# Patient Record
Sex: MALE | Race: WHITE | NOT HISPANIC OR LATINO | Employment: FULL TIME | URBAN - NONMETROPOLITAN AREA
[De-identification: names, ages, dates, MRNs, and addresses within clinical notes are randomized per-mention and may not be internally consistent; named-entity substitution may affect disease eponyms.]

---

## 2021-11-09 ENCOUNTER — HOSPITAL ENCOUNTER (EMERGENCY)
Facility: HOSPITAL | Age: 56
Discharge: HOME | End: 2021-11-09
Attending: EMERGENCY MEDICINE | Admitting: EMERGENCY MEDICINE
Payer: COMMERCIAL

## 2021-11-09 VITALS
HEIGHT: 69 IN | WEIGHT: 190 LBS | OXYGEN SATURATION: 97 % | TEMPERATURE: 98 F | BODY MASS INDEX: 28.14 KG/M2 | RESPIRATION RATE: 17 BRPM | SYSTOLIC BLOOD PRESSURE: 159 MMHG | DIASTOLIC BLOOD PRESSURE: 98 MMHG | HEART RATE: 74 BPM

## 2021-11-09 DIAGNOSIS — R04.0 RIGHT-SIDED EPISTAXIS: Primary | ICD-10-CM

## 2021-11-09 PROCEDURE — 99284 EMERGENCY DEPT VISIT MOD MDM: CPT | Performed by: PHYSICIAN ASSISTANT

## 2021-11-09 PROCEDURE — 99283 EMERGENCY DEPT VISIT LOW MDM: CPT

## 2021-11-09 PROCEDURE — 30903 CONTROL OF NOSEBLEED: CPT | Performed by: PHYSICIAN ASSISTANT

## 2021-11-09 RX ADMIN — SILVER NITRATE APPLICATORS 2 APPLICATOR: 25; 75 STICK TOPICAL at 20:40

## 2021-11-09 RX ADMIN — SILVER NITRATE APPLICATORS 1 APPLICATOR: 25; 75 STICK TOPICAL at 20:32

## 2024-04-22 ENCOUNTER — HOSPITAL ENCOUNTER (EMERGENCY)
Facility: HOSPITAL | Age: 59
Discharge: HOME | End: 2024-04-22
Attending: EMERGENCY MEDICINE | Admitting: EMERGENCY MEDICINE
Payer: COMMERCIAL

## 2024-04-22 ENCOUNTER — APPOINTMENT (EMERGENCY)
Dept: CT IMAGING | Facility: HOSPITAL | Age: 59
End: 2024-04-22
Payer: COMMERCIAL

## 2024-04-22 VITALS
SYSTOLIC BLOOD PRESSURE: 159 MMHG | RESPIRATION RATE: 20 BRPM | BODY MASS INDEX: 30.08 KG/M2 | DIASTOLIC BLOOD PRESSURE: 91 MMHG | OXYGEN SATURATION: 94 % | HEART RATE: 78 BPM | TEMPERATURE: 98.1 F | WEIGHT: 203.71 LBS

## 2024-04-22 DIAGNOSIS — I16.0 HYPERTENSIVE URGENCY: ICD-10-CM

## 2024-04-22 DIAGNOSIS — H34.8120 CENTRAL RETINAL VEIN OCCLUSION WITH MACULAR EDEMA OF LEFT EYE: Primary | ICD-10-CM

## 2024-04-22 LAB
ALBUMIN SERPL BCP-MCNC: 4.5 G/DL (ref 3.5–5)
ALP SERPL-CCNC: 87 U/L (ref 34–104)
ALT SERPL W P-5'-P-CCNC: 33 U/L (ref 7–52)
ANION GAP SERPL CALCULATED.3IONS-SCNC: 7 MMOL/L (ref 4–13)
APTT PPP: 29 SECONDS (ref 23–37)
AST SERPL W P-5'-P-CCNC: 23 U/L (ref 13–39)
BASOPHILS # BLD AUTO: 0.08 THOUSANDS/ÂΜL (ref 0–0.1)
BASOPHILS NFR BLD AUTO: 1 % (ref 0–1)
BILIRUB SERPL-MCNC: 0.57 MG/DL (ref 0.2–1)
BUN SERPL-MCNC: 19 MG/DL (ref 5–25)
CALCIUM SERPL-MCNC: 9.4 MG/DL (ref 8.4–10.2)
CARDIAC TROPONIN I PNL SERPL HS: 4 NG/L
CHLORIDE SERPL-SCNC: 104 MMOL/L (ref 96–108)
CO2 SERPL-SCNC: 26 MMOL/L (ref 21–32)
CREAT SERPL-MCNC: 0.9 MG/DL (ref 0.6–1.3)
EOSINOPHIL # BLD AUTO: 0.09 THOUSAND/ÂΜL (ref 0–0.61)
EOSINOPHIL NFR BLD AUTO: 1 % (ref 0–6)
ERYTHROCYTE [DISTWIDTH] IN BLOOD BY AUTOMATED COUNT: 13.3 % (ref 11.6–15.1)
GFR SERPL CREATININE-BSD FRML MDRD: 92 ML/MIN/1.73SQ M
GLUCOSE SERPL-MCNC: 113 MG/DL (ref 65–140)
GLUCOSE SERPL-MCNC: 98 MG/DL (ref 65–140)
HCT VFR BLD AUTO: 42.7 % (ref 36.5–49.3)
HGB BLD-MCNC: 14 G/DL (ref 12–17)
IMM GRANULOCYTES # BLD AUTO: 0.02 THOUSAND/UL (ref 0–0.2)
IMM GRANULOCYTES NFR BLD AUTO: 0 % (ref 0–2)
INR PPP: 0.98 (ref 0.84–1.19)
LACTATE SERPL-SCNC: 0.7 MMOL/L (ref 0.5–2)
LIPASE SERPL-CCNC: 19 U/L (ref 11–82)
LYMPHOCYTES # BLD AUTO: 2.4 THOUSANDS/ÂΜL (ref 0.6–4.47)
LYMPHOCYTES NFR BLD AUTO: 30 % (ref 14–44)
MAGNESIUM SERPL-MCNC: 2 MG/DL (ref 1.9–2.7)
MCH RBC QN AUTO: 28.6 PG (ref 26.8–34.3)
MCHC RBC AUTO-ENTMCNC: 32.8 G/DL (ref 31.4–37.4)
MCV RBC AUTO: 87 FL (ref 82–98)
MONOCYTES # BLD AUTO: 0.89 THOUSAND/ÂΜL (ref 0.17–1.22)
MONOCYTES NFR BLD AUTO: 11 % (ref 4–12)
NEUTROPHILS # BLD AUTO: 4.46 THOUSANDS/ÂΜL (ref 1.85–7.62)
NEUTS SEG NFR BLD AUTO: 57 % (ref 43–75)
NRBC BLD AUTO-RTO: 0 /100 WBCS
PLATELET # BLD AUTO: 334 THOUSANDS/UL (ref 149–390)
PMV BLD AUTO: 11 FL (ref 8.9–12.7)
POTASSIUM SERPL-SCNC: 3.7 MMOL/L (ref 3.5–5.3)
PROT SERPL-MCNC: 7.5 G/DL (ref 6.4–8.4)
PROTHROMBIN TIME: 13.3 SECONDS (ref 11.6–14.5)
RBC # BLD AUTO: 4.89 MILLION/UL (ref 3.88–5.62)
SODIUM SERPL-SCNC: 137 MMOL/L (ref 135–147)
WBC # BLD AUTO: 7.94 THOUSAND/UL (ref 4.31–10.16)

## 2024-04-22 PROCEDURE — 83605 ASSAY OF LACTIC ACID: CPT | Performed by: EMERGENCY MEDICINE

## 2024-04-22 PROCEDURE — 83690 ASSAY OF LIPASE: CPT | Performed by: EMERGENCY MEDICINE

## 2024-04-22 PROCEDURE — 80053 COMPREHEN METABOLIC PANEL: CPT | Performed by: EMERGENCY MEDICINE

## 2024-04-22 PROCEDURE — 36415 COLL VENOUS BLD VENIPUNCTURE: CPT | Performed by: EMERGENCY MEDICINE

## 2024-04-22 PROCEDURE — 83735 ASSAY OF MAGNESIUM: CPT | Performed by: EMERGENCY MEDICINE

## 2024-04-22 PROCEDURE — 85025 COMPLETE CBC W/AUTO DIFF WBC: CPT | Performed by: EMERGENCY MEDICINE

## 2024-04-22 PROCEDURE — 84484 ASSAY OF TROPONIN QUANT: CPT | Performed by: EMERGENCY MEDICINE

## 2024-04-22 PROCEDURE — 70496 CT ANGIOGRAPHY HEAD: CPT

## 2024-04-22 PROCEDURE — 85730 THROMBOPLASTIN TIME PARTIAL: CPT | Performed by: EMERGENCY MEDICINE

## 2024-04-22 PROCEDURE — 99284 EMERGENCY DEPT VISIT MOD MDM: CPT

## 2024-04-22 PROCEDURE — 93005 ELECTROCARDIOGRAM TRACING: CPT

## 2024-04-22 PROCEDURE — 70498 CT ANGIOGRAPHY NECK: CPT

## 2024-04-22 PROCEDURE — 82948 REAGENT STRIP/BLOOD GLUCOSE: CPT

## 2024-04-22 PROCEDURE — 85610 PROTHROMBIN TIME: CPT | Performed by: EMERGENCY MEDICINE

## 2024-04-22 PROCEDURE — 99285 EMERGENCY DEPT VISIT HI MDM: CPT | Performed by: EMERGENCY MEDICINE

## 2024-04-22 RX ORDER — AMLODIPINE BESYLATE 5 MG/1
5 TABLET ORAL DAILY
Qty: 30 TABLET | Refills: 0 | Status: SHIPPED | OUTPATIENT
Start: 2024-04-22 | End: 2024-05-22

## 2024-04-22 RX ORDER — HYDRALAZINE HYDROCHLORIDE 20 MG/ML
10 INJECTION INTRAMUSCULAR; INTRAVENOUS ONCE
Status: DISCONTINUED | OUTPATIENT
Start: 2024-04-22 | End: 2024-04-22 | Stop reason: HOSPADM

## 2024-04-22 RX ORDER — DEXTROAMPHETAMINE SACCHARATE, AMPHETAMINE ASPARTATE MONOHYDRATE, DEXTROAMPHETAMINE SULFATE AND AMPHETAMINE SULFATE 5; 5; 5; 5 MG/1; MG/1; MG/1; MG/1
20 CAPSULE, EXTENDED RELEASE ORAL
COMMUNITY

## 2024-04-22 RX ORDER — CLONIDINE HYDROCHLORIDE 0.1 MG/1
0.2 TABLET ORAL ONCE
Status: DISCONTINUED | OUTPATIENT
Start: 2024-04-22 | End: 2024-04-22 | Stop reason: HOSPADM

## 2024-04-22 RX ADMIN — IOHEXOL 85 ML: 350 INJECTION, SOLUTION INTRAVENOUS at 12:20

## 2024-04-22 NOTE — ED PROVIDER NOTES
History  Chief Complaint   Patient presents with    Eye Problem     Patient sent from eye doctor for abnormal finding in left eye. Patient having blurred vision for about 1 week. Patient denies any other symptoms.      Patient complains of blurry vision in the center of his left visual field for the past 1 week.  No eye pain.  Was seen by the eye doctor today and diagnosed with a central retinal vein occlusion.  Sent here for further evaluation.  Patient denies any change in speech.  No focal weakness or numbness.  No chest pain or shortness of breath.  No abdominal pain or back pain.  No history of blood thinners.      History provided by:  Patient   used: No    Eye Problem  Location:  Left eye  Quality:  Aching  Severity:  Mild  Onset quality:  Sudden  Duration:  1 week  Timing:  Constant  Progression:  Unchanged  Chronicity:  New  Context: not burn, not chemical exposure, not foreign body and not smoke exposure    Relieved by:  Nothing  Worsened by:  Nothing  Ineffective treatments:  None tried  Associated symptoms: blurred vision    Associated symptoms: no crusting, no discharge, no facial rash, no headaches, no itching, no nausea, no redness, no swelling, no tearing and no vomiting        Prior to Admission Medications   Prescriptions Last Dose Informant Patient Reported? Taking?   amphetamine-dextroamphetamine (ADDERALL XR) 20 MG 24 hr capsule 4/22/2024  Yes Yes   Sig: Take 20 mg by mouth      Facility-Administered Medications: None       Past Medical History:   Diagnosis Date    ADHD        Past Surgical History:   Procedure Laterality Date    EYE SURGERY      LITHOTRIPSY         History reviewed. No pertinent family history.  I have reviewed and agree with the history as documented.    E-Cigarette/Vaping    E-Cigarette Use Never User      E-Cigarette/Vaping Substances     Social History     Tobacco Use    Smoking status: Never    Smokeless tobacco: Never   Vaping Use    Vaping status:  Never Used   Substance Use Topics    Alcohol use: Not Currently     Comment: rarely    Drug use: Never       Review of Systems   Constitutional:  Negative for chills and fever.   HENT:  Negative for ear pain, hearing loss, sore throat, trouble swallowing and voice change.    Eyes:  Positive for blurred vision. Negative for pain, discharge, redness and itching.   Respiratory:  Negative for cough, shortness of breath and wheezing.    Cardiovascular:  Negative for chest pain and palpitations.   Gastrointestinal:  Negative for abdominal pain, blood in stool, constipation, diarrhea, nausea and vomiting.   Genitourinary:  Negative for dysuria, flank pain, frequency and hematuria.   Musculoskeletal:  Negative for joint swelling, neck pain and neck stiffness.   Skin:  Negative for rash and wound.   Neurological:  Negative for dizziness, seizures, syncope, facial asymmetry and headaches.   Psychiatric/Behavioral:  Negative for hallucinations, self-injury and suicidal ideas.    All other systems reviewed and are negative.      Physical Exam  Physical Exam  Vitals and nursing note reviewed.   Constitutional:       General: He is not in acute distress.     Appearance: He is well-developed.   HENT:      Head: Normocephalic and atraumatic.      Right Ear: External ear normal.      Left Ear: External ear normal.   Eyes:      General: No scleral icterus.        Right eye: No discharge.         Left eye: No discharge.      Extraocular Movements: Extraocular movements intact.      Conjunctiva/sclera: Conjunctivae normal.      Comments: Blurriness in the central visual field on the left.   Cardiovascular:      Rate and Rhythm: Normal rate and regular rhythm.      Heart sounds: Normal heart sounds. No murmur heard.  Pulmonary:      Effort: Pulmonary effort is normal.      Breath sounds: Normal breath sounds. No wheezing or rales.   Abdominal:      General: Bowel sounds are normal. There is no distension.      Palpations: Abdomen is  soft.      Tenderness: There is no abdominal tenderness. There is no guarding or rebound.   Musculoskeletal:         General: No deformity. Normal range of motion.      Cervical back: Normal range of motion and neck supple.   Skin:     General: Skin is warm and dry.      Findings: No rash.   Neurological:      General: No focal deficit present.      Mental Status: He is alert and oriented to person, place, and time.      Cranial Nerves: No cranial nerve deficit.   Psychiatric:         Mood and Affect: Mood normal.         Behavior: Behavior normal.         Thought Content: Thought content normal.         Judgment: Judgment normal.         Vital Signs  ED Triage Vitals   Temperature Pulse Respirations Blood Pressure SpO2   04/22/24 1135 04/22/24 1135 04/22/24 1135 04/22/24 1135 04/22/24 1135   98.1 °F (36.7 °C) 87 18 (!) 202/104 99 %      Temp Source Heart Rate Source Patient Position - Orthostatic VS BP Location FiO2 (%)   04/22/24 1135 04/22/24 1135 04/22/24 1135 04/22/24 1135 --   Temporal Monitor Lying Left arm       Pain Score       04/22/24 1230       No Pain           Vitals:    04/22/24 1142 04/22/24 1145 04/22/24 1200 04/22/24 1230   BP: 160/86 160/86 163/99 162/92   Pulse:  75 66 61   Patient Position - Orthostatic VS:  Sitting Sitting Sitting         Visual Acuity  Visual Acuity      Flowsheet Row Most Recent Value   L Pupil Size (mm) 2   R Pupil Size (mm) 2            ED Medications  Medications   hydrALAZINE (APRESOLINE) injection 10 mg (0 mg Intravenous Hold 4/22/24 1142)   cloNIDine (CATAPRES) tablet 0.2 mg (0 mg Oral Hold 4/22/24 1142)   iohexol (OMNIPAQUE) 350 MG/ML injection (MULTI-DOSE) 85 mL (85 mL Intravenous Given 4/22/24 1220)       Diagnostic Studies  Results Reviewed       Procedure Component Value Units Date/Time    HS Troponin 0hr (reflex protocol) [[id number]]  (Normal) Collected: 04/22/24 1141    Lab Status: Final result Specimen: Blood from Arm, Right Updated: 04/22/24 1222     hs TnI 0hr  4 ng/L     Comprehensive metabolic panel [[id number]] Collected: 04/22/24 1141    Lab Status: Final result Specimen: Blood from Arm, Right Updated: 04/22/24 1218     Sodium 137 mmol/L      Potassium 3.7 mmol/L      Chloride 104 mmol/L      CO2 26 mmol/L      ANION GAP 7 mmol/L      BUN 19 mg/dL      Creatinine 0.90 mg/dL      Glucose 98 mg/dL      Calcium 9.4 mg/dL      AST 23 U/L      ALT 33 U/L      Alkaline Phosphatase 87 U/L      Total Protein 7.5 g/dL      Albumin 4.5 g/dL      Total Bilirubin 0.57 mg/dL      eGFR 92 ml/min/1.73sq m     Narrative:      National Kid[employer] Foundation guidelines for Chronic Kidney Disease (CKD):     Stage 1 with normal or high GFR (GFR > 90 mL/min/1.73 square meters)    Stage 2 Mild CKD (GFR = 60-89 mL/min/1.73 square meters)    Stage 3A Moderate CKD (GFR = 45-59 mL/min/1.73 square meters)    Stage 3B Moderate CKD (GFR = 30-44 mL/min/1.73 square meters)    Stage 4 Severe CKD (GFR = 15-29 mL/min/1.73 square meters)    Stage 5 End Stage CKD (GFR <15 mL/min/1.73 square meters)  Note: GFR calculation is accurate only with a steady state creatinine    Magnesium [[id number]]  (Normal) Collected: 04/22/24 1141    Lab Status: Final result Specimen: Blood from Arm, Right Updated: 04/22/24 1218     Magnesium 2.0 mg/dL     Lipase [[id number]]  (Normal) Collected: 04/22/24 1141    Lab Status: Final result Specimen: Blood from Arm, Right Updated: 04/22/24 1218     Lipase 19 u/L     Lactic acid, plasma (w/reflex if result > 2.0) [[id number]]  (Normal) Collected: 04/22/24 1141    Lab Status: Final result Specimen: Blood from Arm, Right Updated: 04/22/24 1209     LACTIC ACID 0.7 mmol/L     Narrative:      Result may be elevated if tourniquet was used during collection.    Protime-INR [[id number]]  (Normal) Collected: 04/22/24 1141    Lab Status: Final result Specimen: Blood from Arm, Right Updated: 04/22/24 1203     Protime 13.3 seconds      INR 0.98    APTT [[id number]]  (Normal) Collected:  04/22/24 1141    Lab Status: Final result Specimen: Blood from Arm, Right Updated: 04/22/24 1203     PTT 29 seconds     CBC and differential [[id number]] Collected: 04/22/24 1141    Lab Status: Final result Specimen: Blood from Arm, Right Updated: 04/22/24 1150     WBC 7.94 Thousand/uL      RBC 4.89 Million/uL      Hemoglobin 14.0 g/dL      Hematocrit 42.7 %      MCV 87 fL      MCH 28.6 pg      MCHC 32.8 g/dL      RDW 13.3 %      MPV 11.0 fL      Platelets 334 Thousands/uL      nRBC 0 /100 WBCs      Segmented % 57 %      Immature Grans % 0 %      Lymphocytes % 30 %      Monocytes % 11 %      Eosinophils Relative 1 %      Basophils Relative 1 %      Absolute Neutrophils 4.46 Thousands/µL      Absolute Immature Grans 0.02 Thousand/uL      Absolute Lymphocytes 2.40 Thousands/µL      Absolute Monocytes 0.89 Thousand/µL      Eosinophils Absolute 0.09 Thousand/µL      Basophils Absolute 0.08 Thousands/µL     Fingerstick Glucose (POCT) [[id number]]  (Normal) Collected: 04/22/24 1134    Lab Status: Final result Specimen: Blood Updated: 04/22/24 1135     POC Glucose 113 mg/dl                    CTA head and neck with and without contrast   Final Result by Luis M Rodriguez DO (04/22 1246)      CT brain: No acute intracranial abnormality.      CT angiography: Normal cervical and intracranial vasculature.                  Workstation performed: [id number]                    Procedures  ECG 12 Lead Documentation Only    Date/Time: 4/22/2024 11:42 AM    Performed by: Guido tMz MD  Authorized by: Guido Mtz MD    ECG reviewed by me, the ED Provider: yes    Patient location:  ED  Rate:     ECG rate:  70  Rhythm:     Rhythm: sinus rhythm    Ectopy:     Ectopy: none    QRS:     QRS axis:  Normal           ED Course  ED Course as of 04/22/24 1252   Mon Apr 22, 2024   1148 Blood pressure now 160/86.  Will hold off giving blood pressure medications.   1232 Discussed with neurology on-call, Dr. Varela.  If definitely a  central vein occlusion, neurology states no other further workup for stroke.                               SBIRT 20yo+      Flowsheet Row Most Recent Value   Initial Alcohol Screen: US AUDIT-C     1. How often do you have a drink containing alcohol? 0 Filed at: 04/22/2024 1145   2. How many drinks containing alcohol do you have on a typical day you are drinking?  0 Filed at: 04/22/2024 1145   3a. Male UNDER 65: How often do you have five or more drinks on one occasion? 0 Filed at: 04/22/2024 1145   3b. FEMALE Any Age, or MALE 65+: How often do you have 4 or more drinks on one occassion? 0 Filed at: 04/22/2024 1145   Audit-C Score 0 Filed at: 04/22/2024 1145   RASHARD: How many times in the past year have you...    Used an illegal drug or used a prescription medication for non-medical reasons? Never Filed at: 04/22/2024 1145                      Medical Decision Making  Amount and/or Complexity of Data Reviewed  Labs: ordered.  Radiology: ordered.    Risk  Prescription drug management.             Disposition  Final diagnoses:   Central retinal vein occlusion with macular edema of left eye   Hypertensive urgency     Time reflects when diagnosis was documented in both MDM as applicable and the Disposition within this note       Time User Action Codes Description Comment    4/22/2024 12:33 PM Guido Mtz Add [H34.8120] Central retinal vein occlusion with macular edema of left eye     4/22/2024 12:51 PM Guido Mtz Add [I16.0] Hypertensive urgency           ED Disposition       ED Disposition   Discharge    Condition   Stable    Date/Time   Mon Apr 22, 2024 1252    Comment   Thang Garza discharge to home/self care.                   Follow-up Information       Follow up With Specialties Details Why Contact Info    Barry Austin MD Family Medicine Call in 2 days  [address] [id number]  [phone]              Patient's Medications   Discharge Prescriptions    AMLODIPINE (NORVASC) 5 MG  TABLET    Take 1 tablet (5 mg total) by mouth daily       Start Date: 4/22/2024 End Date: 5/22/2024       Order Dose: 5 mg       Quantity: 30 tablet    Refills: 0           PDMP Review       None            ED Provider  Electronically Signed by             Guido Mtz MD  04/22/24 8917

## 2024-04-22 NOTE — QUICK NOTE
Patient not seen, he was discharged before I attempted the tele visit.   Recommendation based on information provided by ED physician over the phone     57 yo M, presented with painless L monocular vision impairment for 1 week with reported worsening symptoms for 2 days. Per records from patient's eye doctor, patient was found to have central retinal vein occlusion with macular edema.     Per Dr. Mtz, patient reported no focal neurological deficit.     CT Head, no acute intracranial abnormality  CTA H/N, normal      Recommendation  As I discussed with ED Dr. Mtz, if patient truly has retinal vein occlusion caused vision impairment, no further inpatient stroke workup indicated from inpatient neurology's perspective. Recommend to obtain A1c, lipid panel, hypercoagulation panel, RPR and refer to hematology if abnormal hypercoagulation panel noticed.The benefit of AP/AC in treating CRVO is controversial. However, other ddx including CRAO or ocular condition could also cause monocular painless vision impairment. Recommend patient to obtain an urgent second opinion from ophthalmology specialist.     He can follow up with general or stroke neurology as outpatient.

## 2024-04-23 LAB
ATRIAL RATE: 74 BPM
P AXIS: 50 DEGREES
PR INTERVAL: 162 MS
QRS AXIS: -50 DEGREES
QRSD INTERVAL: 98 MS
QT INTERVAL: 382 MS
QTC INTERVAL: 424 MS
T WAVE AXIS: 25 DEGREES
VENTRICULAR RATE: 74 BPM

## 2024-04-23 PROCEDURE — 93010 ELECTROCARDIOGRAM REPORT: CPT | Performed by: INTERNAL MEDICINE

## 2024-06-18 ENCOUNTER — OFFICE VISIT (OUTPATIENT)
Dept: NEUROLOGY | Facility: CLINIC | Age: 59
End: 2024-06-18
Payer: COMMERCIAL

## 2024-06-18 VITALS
WEIGHT: 201.3 LBS | TEMPERATURE: 97.8 F | SYSTOLIC BLOOD PRESSURE: 139 MMHG | BODY MASS INDEX: 29.82 KG/M2 | HEART RATE: 64 BPM | DIASTOLIC BLOOD PRESSURE: 79 MMHG | HEIGHT: 69 IN

## 2024-06-18 DIAGNOSIS — H34.8120 CENTRAL RETINAL VEIN OCCLUSION WITH MACULAR EDEMA OF LEFT EYE: Primary | ICD-10-CM

## 2024-06-18 DIAGNOSIS — I10 HYPERTENSION, UNSPECIFIED TYPE: ICD-10-CM

## 2024-06-18 PROCEDURE — 99245 OFF/OP CONSLTJ NEW/EST HI 55: CPT | Performed by: STUDENT IN AN ORGANIZED HEALTH CARE EDUCATION/TRAINING PROGRAM

## 2024-06-18 RX ORDER — LISDEXAMFETAMINE DIMESYLATE 60 MG/1
60 CAPSULE ORAL EVERY MORNING
COMMUNITY

## 2024-06-18 NOTE — PROGRESS NOTES
[facility]  Neurological Services Consult Note    Patient Name: Thang Garza  : 1965    MRN: [id number]    CONSULTING PROVIDER:  Timothy Varela MD  [address] [place] [id number]      Assessment/Plan:  1. Central retinal vein occlusion with macular edema of left eye  Thrombosis Panel    Hemoglobin A1C    Lipid panel    RPR-Syphilis Screening (Total Syphilis IGG/IGM)    MRI brain without contrast    XR orbits for foreign body    CANCELED: MRI brain without contrast      2. Hypertension, unspecified type          Thang is a pleasant 59-year-old gentleman with PMH ADHD, HTN presenting for central retinal vein occlusion (ED visit 2024).  He actually is overall doing fairly well, although still has some significant blurring of his vision centrally in his left eye.  He denies any other neurologic symptoms, either with this episode or previously.    We reviewed the potential pathophysiology of a central retinal vein occlusion at length today.  I encouraged him to continue to follow with ophthalmology and his retinal specialist for treatment; will defer this to them.  Reviewed that at this point, we are essentially screening for risk factors for future clots/clotting disorders/stroke risk.  I have ordered blood work to evaluate the above; namely, HbA1c, lipid panel, RPR, and a thrombosis/hypercoagulability panel.  Should a significant result be found on the hypercoagulability panel, I will likely refer to hematology/oncology for their recommendations.  Should his lipid panel or other blood work be abnormal, I would defer formal management to his PCP.  Reviewed the importance of lifestyle factors in avoiding stroke; namely, avoidance of smoking and recreational drug use, moderation of alcohol use, and management/good control of his blood pressure, blood sugars, and blood cholesterol.  I did encourage him to keep his appointment for a sleep study, reviewing that GRZEGORZ is itself a stroke  "risk factor.  I would like to obtain an MRI of his brain to evaluate for any prior potentially subclinical strokes.  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  He also tells me that he has stainless steel implants in his right maxilla, however does not believe these are magnetic.  If unable to obtain the MRI due to any of the above, reassuringly, the CT of his head was negative for overt/large prior strokes.  He will Return in about 3 months (around 9/18/2024).                                                                  Thank you for allowing me to participate in the care of your patient.  If there are any questions regarding evaluation please feel free to reach out.       ________________________________________________________________________________________________    Subjective:  Chief Complaint   Patient presents with    Hospital Follow-up       59 y.o. right - handed male with PMH ADHD, HTN presenting for central retinal vein occlusion (ED visit 4/22/2024).    Per neurology team's notes:  *Patient not seen by inpatient neurology team; recommendations made via phone.   \"Recommendation  ...if patient truly has retinal vein occlusion caused vision impairment, no further inpatient stroke workup indicated from inpatient neurology's perspective. Recommend to obtain A1c, lipid panel, hypercoagulation panel, RPR and refer to hematology if abnormal hypercoagulation panel noticed.The benefit of AP/AC in treating CRVO is controversial. However, other ddx including CRAO or ocular condition could also cause monocular painless vision impairment. Recommend patient to obtain an urgent second opinion from ophthalmology specialist.      He can follow up with general or stroke neurology " "as outpatient.\"      At today's visit, he re-states what was noted in the ED note, namely that he had a blurry spot in his vision, and his ophthalmologist told him he had a CRVO. He stated that \"this was caused by something, blood pressure or similar.\"     He is seeing a retinal specialist who is giving injections;he states he has had 2 so far, and it has helped a little bit. Denies new symptoms or worsening. Has started the BP medication that was begun in the ED, tolerating well.    ADHD: Taking Vyvanse 60mg in the morning. Was taking Adderall 20mg in the afternoon due to feeling tired, however has not done so in a long time.     His PCP did order a sleep study to screen for GRZEGORZ.       Current Outpatient Medications   Medication Sig Dispense Refill    amLODIPine (NORVASC) 5 mg tablet Take 1 tablet (5 mg total) by mouth daily 30 tablet 0    lisdexamfetamine (VYVANSE) 60 MG capsule Take 60 mg by mouth every morning      NON FORMULARY Biotin 5000 daily      NON FORMULARY Calcium and Zinc supplement      NON FORMULARY Take Amharic sea johnson daily      amphetamine-dextroamphetamine (ADDERALL XR) 20 MG 24 hr capsule Take 20 mg by mouth (Patient not taking: Reported on 6/18/2024)       No current facility-administered medications for this visit.       No Known Allergies    Past Medical History:   Diagnosis Date    ADHD     Dizziness     :   Past Surgical History:   Procedure Laterality Date    EYE SURGERY      LITHOTRIPSY       Social History     Socioeconomic History    Marital status: /Civil Union     Spouse name: Not on file    Number of children: Not on file    Years of education: Not on file    Highest education level: Not on file   Occupational History    Not on file   Tobacco Use    Smoking status: Never    Smokeless tobacco: Never   Vaping Use    Vaping status: Never Used   Substance and Sexual Activity    Alcohol use: Yes     Comment: rarely    Drug use: Never    Sexual activity: Not on file   Other Topics " "Concern    Not on file   Social History Narrative    Not on file     Social Determinants of Health     Financial Resource Strain: Not on file   Food Insecurity: No Food Insecurity (6/5/2024)    Received from [facility]    Hunger Vital Sign     Worried About Running Out of Food in the Last Year: Never true     Ran Out of Food in the Last Year: Never true   Transportation Needs: Not on file   Physical Activity: Not on file   Stress: Not on file   Social Connections: Not on file   Intimate Partner Violence: Not on file   Housing Stability: Not on file      Family History   Problem Relation Age of Onset    Cancer Mother     Cancer Father        Review of Symptoms:      10-point system review completed, all of which are negative except as mentioned above.    Labs:  4/22/2024:  PT/INR: WNL  APTT: WNL  CMP: WNL  CBC: Unremarkable    Imaging/Procedures:   CTA head and neck 4/20/2024:  IMPRESSION:     CT brain: No acute intracranial abnormality.     CT angiography: Normal cervical and intracranial vasculature.         Objective:  Physical Exam:      /79 (BP Location: Left arm, Patient Position: Sitting, Cuff Size: Large)   Pulse 64   Temp 97.8 °F (36.6 °C) (Temporal)   Ht 5' 9\" (1.753 m)   Wt 91.3 kg (201 lb 4.8 oz)   BMI 29.73 kg/m²      Gen: A&O x 4, NAD, cooperative  HEENT: NC/AT, EOMI, PERRL, mmm, no carotid bruits, neck supple, no meningeal signs  Chest: No evidence of respiratory distress, no accessory muscle use; no evidence of peripheral cyanosis   Abd: soft/NT/ND, +BS  Ext: no edema, no calf tenderness b/l  Endo: no thyromegaly  Psych: no depression or anxiety apparent   Skin: no rashes or lesions    Neurologic Exam:  Mental Status: A&O x 4, NAD, speech clear, language fluent, comprehension intact, repetition and naming intact    Cranial Nerve Exam:   CN II-XII intact: Funduscopic: Probable blanching noted in left eye with no abnormalities noted in right eye, however examination limited due to pupillary " constriction; PERRL, VFF other than central blurring of vision in left eye only, no nystagmus, no gaze paresis, sensation V1-V3 intact b/l, muscles of facial expression symmetric; hearing intact to conversational tone, palate elevates symmetrically, shoulder elevation symmetric and tongue protrudes midline with movement side to side albeit mildly limited due to chronic.    Motor Exam:       Strength 5/5 UE's/LE's b/l  Tone and bulk normal   No pronator drift    Deep Tendon Reflexes: 2/4 biceps, triceps, brachioradialis, patellar, and achilles b/l, flexor plantar responses b/l    Sensation: Intact light touch/temperature/pinprick/vibration UE's/LE's b/l    Coordination/Cerebellum:       Tremors--none      Rapidly alternating movements: no dysdiadochokinesia b/l                Heel-to-Shin: no dysmetria b/l      Finger-to-Nose: no dysmetria b/l    Gait and stance:      Gait: Normal casual, tiptoe, heel walk, and tandem gait.  No evidence of ataxia present.          I have spent a total time of >55 minutes on 06/18/24 in caring for this patient including Diagnostic results, Prognosis, Risks and benefits of tx options, Instructions for management, Patient and family education, Importance of tx compliance, Risk factor reductions, Documenting in the medical record, Reviewing / ordering tests, medicine, procedures  , and Obtaining or reviewing history  .      Electronically signed by:    Marky Castelan DO  Board-Certified Neurology and Sleep Medicine  [facility]  06/18/24

## 2024-06-18 NOTE — PATIENT INSTRUCTIONS
-We will obtain some blood work to look for rare but significant causes/risk factors for abnormal blood clotting and/or stroke  -I am also   -Continue following with your ophthalmologist and retinal specialist per their recommendations  -Defer blood pressure management to your PCP  -I do highly encourage you to keep the appontment with sleep medicine and the sleep study, as GRZEGORZ is a risk factor for stroke as well as other cardiovascular problems      Mediterranean Diet   AMBULATORY CARE:   A Mediterranean diet  is a meal plan that includes foods that are commonly eaten in countries that border the [place]. This meal plan may provide several health benefits. These include losing or maintaining weight, and decreasing blood pressure, blood sugar, and cholesterol levels. It may also help protect against certain health conditions such as heart disease, cancer, type 2 diabetes, and Alzheimer disease. Work with a dietitian to develop a meal plan that is right for you.  Foods to include in the Mediterranean diet:   Include fruits and vegetables in each meal.  Eat a variety of fresh fruits and vegetables.    Choose whole grains every day.  These foods include whole-grain breads, pastas, and cereals. It also includes brown rice, quinoa, and millet.    Use unsaturated fats instead of saturated fats.  Cook with olive or canola oil. Limit saturated fats, such as butter, margarine, and shortening. Saturated fat is an unhealthy fat that can increase your cholesterol levels.    Choose plant foods, poultry, and fish as your main sources of protein.      Eat plant-based foods that provide protein,  such as lentils, beans, chickpeas, nuts, and seeds. Choose mostly plant-based foods in place of meat on most days of the week.    Eat protein foods high in omega-3 fats.  Fish high in omega-3 fats include salmon, trout, and tuna. Include these types of fish 1 or 2 times each week. Limit fish high in mercury, such as shark,  swordfish, tilefish, and carrillo mackerel. Omega-3 fats are also found in walnuts and flaxseed.         Choose poultry (chicken or turkey)  without skin instead of red meat. Red meat is high in saturated fat. Limit eggs and high-fat meats, such as garrido, sausage, and hot dogs.    Choose low-fat dairy foods  such as nonfat or 1% milk, or low-fat almond, cashew, or soy milk. Other examples include low-fat cheese, yogurt, and cottage cheese.    Limit sweets.  Limit your intake of high-sugar foods, such as soda, desserts, and candy.    Talk to your healthcare provider about alcohol.  Studies have shown that moderate intake of wine may reduce the risk of heart disease. A moderate amount of wine is 1 serving for women and men 65 years and older each day. Two servings is recommended for men 21 to 64 years of age each day. A serving of wine is 5 ounces.    Other things you need to know if you follow the Mediterranean diet:   Include foods high in iron and vitamin C.  Plant-based foods that are high in iron include spinach, beans, tofu, and artichoke. Eat a serving of vitamin C with any iron-rich food to help your body absorb more iron. Examples include oranges, strawberries, cantaloupe, broccoli, and yellow peppers.         Get regular physical activity.  The Mediterranean diet will have the most benefit if you get regular physical activity. Get 30 minutes of physical activity at least 5 days a week. Choose physical activities that increase your heart rate. Examples include walking, hiking, swimming, and riding a bike. Ask your healthcare provider about the best exercise plan for you.       © Copyright [employer] 2023 Information is for End User's use only and may not be sold, redistributed or otherwise used for commercial purposes.  The above information is an  only. It is not intended as medical advice for individual conditions or treatments. Talk to your doctor, nurse or pharmacist before following any medical  regimen to see if it is safe and effective for you.

## 2024-07-02 ENCOUNTER — HOSPITAL ENCOUNTER (OUTPATIENT)
Dept: RADIOLOGY | Facility: HOSPITAL | Age: 59
Discharge: HOME | End: 2024-07-02
Payer: COMMERCIAL

## 2024-07-02 ENCOUNTER — HOSPITAL ENCOUNTER (OUTPATIENT)
Dept: MRI IMAGING | Facility: HOSPITAL | Age: 59
Discharge: HOME | End: 2024-07-02
Attending: STUDENT IN AN ORGANIZED HEALTH CARE EDUCATION/TRAINING PROGRAM
Payer: COMMERCIAL

## 2024-07-02 DIAGNOSIS — H34.8120 CENTRAL RETINAL VEIN OCCLUSION WITH MACULAR EDEMA OF LEFT EYE: ICD-10-CM

## 2024-07-02 PROCEDURE — 70551 MRI BRAIN STEM W/O DYE: CPT

## 2024-09-19 ENCOUNTER — TELEPHONE (OUTPATIENT)
Dept: NEUROLOGY | Facility: CLINIC | Age: 59
End: 2024-09-19

## 2024-09-23 ENCOUNTER — OFFICE VISIT (OUTPATIENT)
Dept: NEUROLOGY | Facility: CLINIC | Age: 59
End: 2024-09-23
Payer: COMMERCIAL

## 2024-09-23 VITALS
BODY MASS INDEX: 30.01 KG/M2 | OXYGEN SATURATION: 99 % | SYSTOLIC BLOOD PRESSURE: 126 MMHG | HEIGHT: 69 IN | HEART RATE: 63 BPM | WEIGHT: 202.6 LBS | TEMPERATURE: 97.4 F | DIASTOLIC BLOOD PRESSURE: 72 MMHG

## 2024-09-23 DIAGNOSIS — H34.8120 CENTRAL RETINAL VEIN OCCLUSION WITH MACULAR EDEMA OF LEFT EYE: Primary | ICD-10-CM

## 2024-09-23 DIAGNOSIS — G47.33 OBSTRUCTIVE SLEEP APNEA (ADULT) (PEDIATRIC): ICD-10-CM

## 2024-09-23 DIAGNOSIS — I10 HYPERTENSION, UNSPECIFIED TYPE: ICD-10-CM

## 2024-09-23 DIAGNOSIS — R73.03 PREDIABETES: ICD-10-CM

## 2024-09-23 PROCEDURE — 99213 OFFICE O/P EST LOW 20 MIN: CPT | Performed by: STUDENT IN AN ORGANIZED HEALTH CARE EDUCATION/TRAINING PROGRAM

## 2024-09-23 NOTE — PATIENT INSTRUCTIONS
-Continue following with your ophthalmologist and retinal specialist per their recommendations  -Defer further evaluation and management of blood pressure and prediabetes to your PCP  -Continue CPAP for GRZEGORZ, and continue to follow with your Sleep Specialist   -Practice good Sleep Hygiene, as outlined below.        Good Sleep Hygiene    Wake up at the same time every day, even on the weekends.  Use your bed for sleep and intimacy only.  If you have been in bed awake for 30 minutes, get up and leave the bedroom. Choose a dull activity not involving a blue screen (TV, computer, handheld devices). Go back to bed when you feel sleepy.  Avoid caffeine, nicotine and alcohol before you go to bed.  Avoid large meals before you go to bed.  Avoid using screens (computers, tablets, smartphones, etc.) for at least 1 hour before bedtime  Exercise regularly, but do not exercise right before you go to bed.  Avoid daytime naps. If you do take a nap, sleep for 20-40 minutes, and not after dinner.

## 2024-09-23 NOTE — PROGRESS NOTES
[facility]  Neurology Progress Note  Patient Name: Thang Garza     : 1965  MRN: [id number]      Assessment/Plan:    1. Central retinal vein occlusion with macular edema of left eye        2. Obstructive sleep apnea (adult) (pediatric)        3. Hypertension, unspecified type        4. Prediabetes            Thang is a very pleasant 59-year-old gentleman with a PMHx of ADHD, HTN who presents in follow up for central retinal vein occlusion (ED visit 2024).  He is overall doing well, with no new symptoms or concerns since his last visit with me.      Recommended he continue to follow with ophthalmology and the retinal specialist per their recommendations  Encouraged him to continue utilizing his CPAP for management of his GRZEGORZ, and to continue following with his sleep medicine specialist  Encouraged ongoing monitoring and management of his blood pressure and prediabetes per his PCP  At this time, I do not feel that any specific medications are needed from a neurology standpoint, given this was primarily a venous process and his workup for hypercoagulability thus far has been negative.  He will Return if symptoms worsen or fail to improve.        ________________________________________________________________________________________________    Per Last Visit Note (Date: 2024):  Thang is a pleasant 59-year-old gentleman with PMH ADHD, HTN presenting for central retinal vein occlusion (ED visit 2024).  He actually is overall doing fairly well, although still has some significant blurring of his vision centrally in his left eye.  He denies any other neurologic symptoms, either with this episode or previously.     We reviewed the potential pathophysiology of a central retinal vein occlusion at length today.  I encouraged him to continue to follow with ophthalmology and his retinal specialist for treatment; will defer this to them.  Reviewed that at this point, we are  "essentially screening for risk factors for future clots/clotting disorders/stroke risk.  I have ordered blood work to evaluate the above; namely, HbA1c, lipid panel, RPR, and a thrombosis/hypercoagulability panel.  Should a significant result be found on the hypercoagulability panel, I will likely refer to hematology/oncology for their recommendations.  Should his lipid panel or other blood work be abnormal, I would defer formal management to his PCP.  Reviewed the importance of lifestyle factors in avoiding stroke; namely, avoidance of smoking and recreational drug use, moderation of alcohol use, and management/good control of his blood pressure, blood sugars, and blood cholesterol.  I did encourage him to keep his appointment for a sleep study, reviewing that GRZEGORZ is itself a stroke risk factor.  I would like to obtain an MRI of his brain to evaluate for any prior potentially subclinical strokes.  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  He also tells me that he has stainless steel implants in his right maxilla, however does not believe these are magnetic.  If unable to obtain the MRI due to any of the above, reassuringly, the CT of his head was negative for overt/large prior strokes.  He will Return in about 3 months (around 9/18/2024).         Imaging/Other Studies:  MRI brain without contrast 7/2/2024:  IMPRESSION:     Mild white matter changes suggestive of chronic microangiopathy.  No acute intracranial pathology.      CTA head and neck 4/20/2024:  IMPRESSION:     CT brain: No acute intracranial abnormality.     CT angiography: Normal cervical and intracranial vasculature.      Thrombosis panel 7/5/2024:  Overall unremarkable; tested: Antithrombin III activity, PT/INR, " "fibrinogen, lupus sensitive APTT screen, protein S activity, cardiolipin IgG antibody, protein C activity, cardiolipin IgM antibody, beta-2 glycoprotein IgG and IgM, dilute Juan C viper venom time      HbA1c 7/5/2024:   Mildly elevated 6.2      RPR 7/5/2024:  Unremarkable      ________________________________________________________________________________________________    Subjective:      Thang Garza is a 59 y.o. male with a PMHx of ADHD, HTN who presents in follow up for central retinal vein occlusion (ED visit 4/22/2024).    Retina specialist? - On second shot of Terry, believes helping with symptoms.  Sleep study? - Obtained HSAT through [facility]. Doing well with CPAP device.      No new symptoms since last visit.           Objective:  Current Outpatient Medications   Medication Sig Dispense Refill    amLODIPine (NORVASC) 5 mg tablet Take 1 tablet (5 mg total) by mouth daily 30 tablet 0    cyanocobalamin (VITAMIN B-12) 500 MCG tablet Take 500 mcg by mouth daily      lisdexamfetamine (VYVANSE) 60 MG capsule Take 60 mg by mouth every morning      NON FORMULARY Biotin 5000 daily      NON FORMULARY Calcium and Zinc supplement      NON FORMULARY Take [place] johnson daily      amphetamine-dextroamphetamine (ADDERALL XR) 20 MG 24 hr capsule Take 20 mg by mouth (Patient not taking: Reported on 6/18/2024)       No current facility-administered medications for this visit.       Vital Signs:  /72 (BP Location: Left arm, Patient Position: Sitting, Cuff Size: Large)   Pulse 63   Temp (!) 97.4 °F (36.3 °C) (Temporal)   Ht 5' 9\" (1.753 m)   Wt 91.9 kg (202 lb 9.6 oz)   SpO2 99%   BMI 29.92 kg/m²      General: A&O x 4, NAD, cooperative  HEENT: NC/AT, EOMI, PERRL, mmm, no carotid bruits, neck supple  Chest: No evidence of respiratory distress, no accessory muscle use; no evidence of peripheral cyanosis  Extremities: no edema, no calf tenderness b/l    Neurological Exam:         Mental Status:  A&O x 4, NAD, " speech clear, language fluent, comprehension intact, repetition and naming intact     Cranial Nerves:  CN II-XII intact other than medial/central blurring of vision in left eye only      Sensation: intact LT/temp UE/LE's b/l     Motor: 5/5 UE/LE's b/l, tone and bulk normal, no pronator drift     Reflexes: 2/4 biceps, triceps, brachioradialis, patellar, and achilles bilaterally; flexor plantar responses bilaterally     Coordination:       Tremors-- None       Rapidly alternating movements (IRMA): Dysmetria neither side                               Dysdiadonchokinesis neither side      Heel-Shin:Right-- Normal        Left-- Normal      Finger-Nose: Right-- Normal   Left-- Normal     Gait/Station: Deferred            Electronically signed by:    Marky Castelan DO  Board-Certified Neurology and Sleep Medicine  [facility]  09/23/24